# Patient Record
Sex: FEMALE | Race: WHITE | Employment: STUDENT | URBAN - METROPOLITAN AREA
[De-identification: names, ages, dates, MRNs, and addresses within clinical notes are randomized per-mention and may not be internally consistent; named-entity substitution may affect disease eponyms.]

---
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
## 2023-06-28 ENCOUNTER — TELEPHONE (OUTPATIENT)
Dept: FAMILY MEDICINE CLINIC | Age: 18
End: 2023-06-28

## 2023-07-13 ENCOUNTER — OFFICE VISIT (OUTPATIENT)
Dept: FAMILY MEDICINE CLINIC | Age: 18
End: 2023-07-13
Payer: COMMERCIAL

## 2023-07-13 VITALS
SYSTOLIC BLOOD PRESSURE: 104 MMHG | HEIGHT: 64 IN | WEIGHT: 135.9 LBS | HEART RATE: 88 BPM | RESPIRATION RATE: 16 BRPM | DIASTOLIC BLOOD PRESSURE: 60 MMHG | BODY MASS INDEX: 23.2 KG/M2 | OXYGEN SATURATION: 98 % | TEMPERATURE: 96.8 F

## 2023-07-13 DIAGNOSIS — R42 DIZZINESS: ICD-10-CM

## 2023-07-13 DIAGNOSIS — Z00.00 ANNUAL PHYSICAL EXAM: Primary | ICD-10-CM

## 2023-07-13 DIAGNOSIS — Z00.00 ANNUAL PHYSICAL EXAM: ICD-10-CM

## 2023-07-13 LAB
ERYTHROCYTE [DISTWIDTH] IN BLOOD BY AUTOMATED COUNT: 12 FL (ref 11.5–15)
HCT VFR BLD AUTO: 45.8 % (ref 34–48)
HGB BLD-MCNC: 14.4 G/DL (ref 11.5–15.5)
MCH RBC QN AUTO: 30.2 PG (ref 26–35)
MCHC RBC AUTO-ENTMCNC: 31.4 % (ref 32–34.5)
MCV RBC AUTO: 96 FL (ref 80–99.9)
PLATELET # BLD AUTO: 221 E9/L (ref 130–450)
PMV BLD AUTO: 10.9 FL (ref 7–12)
RBC # BLD AUTO: 4.77 E12/L (ref 3.5–5.5)
WBC # BLD: 5.3 E9/L (ref 4.5–11.5)

## 2023-07-13 PROCEDURE — 99395 PREV VISIT EST AGE 18-39: CPT | Performed by: FAMILY MEDICINE

## 2023-07-13 SDOH — ECONOMIC STABILITY: HOUSING INSECURITY
IN THE LAST 12 MONTHS, WAS THERE A TIME WHEN YOU DID NOT HAVE A STEADY PLACE TO SLEEP OR SLEPT IN A SHELTER (INCLUDING NOW)?: NO

## 2023-07-13 SDOH — ECONOMIC STABILITY: INCOME INSECURITY: HOW HARD IS IT FOR YOU TO PAY FOR THE VERY BASICS LIKE FOOD, HOUSING, MEDICAL CARE, AND HEATING?: NOT HARD AT ALL

## 2023-07-13 SDOH — ECONOMIC STABILITY: FOOD INSECURITY: WITHIN THE PAST 12 MONTHS, YOU WORRIED THAT YOUR FOOD WOULD RUN OUT BEFORE YOU GOT MONEY TO BUY MORE.: NEVER TRUE

## 2023-07-13 SDOH — ECONOMIC STABILITY: FOOD INSECURITY: WITHIN THE PAST 12 MONTHS, THE FOOD YOU BOUGHT JUST DIDN'T LAST AND YOU DIDN'T HAVE MONEY TO GET MORE.: NEVER TRUE

## 2023-07-13 ASSESSMENT — PATIENT HEALTH QUESTIONNAIRE - PHQ9
1. LITTLE INTEREST OR PLEASURE IN DOING THINGS: 0
2. FEELING DOWN, DEPRESSED OR HOPELESS: 0
SUM OF ALL RESPONSES TO PHQ9 QUESTIONS 1 & 2: 0
SUM OF ALL RESPONSES TO PHQ QUESTIONS 1-9: 0

## 2023-07-13 NOTE — PROGRESS NOTES
plan.    All questions answered. Sandra Reeves MD  7/13/2023    I have personally reviewed and updated the chief complaint, HPI, Past Medical, Family and Social History, as well as the above Review of Systems.

## 2023-07-14 LAB
ALBUMIN SERPL-MCNC: 4.8 G/DL (ref 3.5–5.2)
ALP SERPL-CCNC: 42 U/L (ref 35–104)
ALT SERPL-CCNC: 8 U/L (ref 0–32)
ANION GAP SERPL CALCULATED.3IONS-SCNC: 15 MMOL/L (ref 7–16)
AST SERPL-CCNC: 21 U/L (ref 0–31)
BILIRUB SERPL-MCNC: 0.2 MG/DL (ref 0–1.2)
BUN SERPL-MCNC: 16 MG/DL (ref 6–20)
CALCIUM SERPL-MCNC: 10 MG/DL (ref 8.6–10.2)
CHLORIDE SERPL-SCNC: 102 MMOL/L (ref 98–107)
CO2 SERPL-SCNC: 20 MMOL/L (ref 22–29)
CREAT SERPL-MCNC: 0.7 MG/DL (ref 0.5–1)
GLUCOSE SERPL-MCNC: 75 MG/DL (ref 74–99)
IRON SATN MFR SERPL: 25 % (ref 15–50)
IRON SERPL-MCNC: 98 MCG/DL (ref 37–145)
POTASSIUM SERPL-SCNC: 5.2 MMOL/L (ref 3.5–5)
PROT SERPL-MCNC: 7.3 G/DL (ref 6.4–8.3)
SODIUM SERPL-SCNC: 137 MMOL/L (ref 132–146)
TIBC SERPL-MCNC: 386 MCG/DL (ref 250–450)
TSH SERPL-MCNC: 1.62 UIU/ML (ref 0.27–4.2)

## 2024-05-21 ASSESSMENT — PATIENT HEALTH QUESTIONNAIRE - PHQ9
SUM OF ALL RESPONSES TO PHQ QUESTIONS 1-9: 0
1. LITTLE INTEREST OR PLEASURE IN DOING THINGS: NOT AT ALL
SUM OF ALL RESPONSES TO PHQ9 QUESTIONS 1 & 2: 0
2. FEELING DOWN, DEPRESSED OR HOPELESS: NOT AT ALL
2. FEELING DOWN, DEPRESSED OR HOPELESS: NOT AT ALL
SUM OF ALL RESPONSES TO PHQ9 QUESTIONS 1 & 2: 0
SUM OF ALL RESPONSES TO PHQ QUESTIONS 1-9: 0
1. LITTLE INTEREST OR PLEASURE IN DOING THINGS: NOT AT ALL

## 2024-05-24 ENCOUNTER — OFFICE VISIT (OUTPATIENT)
Dept: FAMILY MEDICINE CLINIC | Age: 19
End: 2024-05-24

## 2024-05-24 VITALS
RESPIRATION RATE: 16 BRPM | OXYGEN SATURATION: 98 % | DIASTOLIC BLOOD PRESSURE: 62 MMHG | WEIGHT: 132.9 LBS | SYSTOLIC BLOOD PRESSURE: 110 MMHG | TEMPERATURE: 97.9 F | BODY MASS INDEX: 22.69 KG/M2 | HEART RATE: 81 BPM | HEIGHT: 64 IN

## 2024-05-24 DIAGNOSIS — Z00.00 ANNUAL PHYSICAL EXAM: ICD-10-CM

## 2024-05-24 DIAGNOSIS — R20.0 NUMBNESS: ICD-10-CM

## 2024-05-24 DIAGNOSIS — R42 VERTIGO: Primary | ICD-10-CM

## 2024-05-24 RX ORDER — MECLIZINE HCL 12.5 MG/1
12.5 TABLET ORAL 3 TIMES DAILY PRN
Qty: 30 TABLET | Refills: 0 | Status: SHIPPED | OUTPATIENT
Start: 2024-05-24 | End: 2024-06-03

## 2024-05-24 NOTE — PROGRESS NOTES
to schedule as soon as possible if overdue, as this is important in assessing for undiagnosed pathology, especially cancer, as well as protecting against potentially harmful/life threatening disease.        Patient and/or guardian verbalizes understanding and agrees with above counseling, assessment and plan.    All questions answered.    Halima Hayward MD  5/24/2024    I have personally reviewed and updated the chief complaint, HPI, Past Medical, Family and Social History, as well as the above Review of Systems.

## 2024-06-13 ENCOUNTER — TELEPHONE (OUTPATIENT)
Dept: FAMILY MEDICINE CLINIC | Age: 19
End: 2024-06-13

## 2024-06-24 DIAGNOSIS — R20.0 NUMBNESS: ICD-10-CM

## 2024-06-24 NOTE — TELEPHONE ENCOUNTER
Dang called and wanted to know if the results for Jaylyn's MRI came back yet because they are just a little worried and have not heard anything yet-just wanted to put their minds at ease  
Left message last week following up with pt  
She must've had it done outside of [facility] as nothing is in her chart. Nothing has been faxed over yet. Awaiting results  
no

## 2024-07-03 ENCOUNTER — OFFICE VISIT (OUTPATIENT)
Dept: FAMILY MEDICINE CLINIC | Age: 19
End: 2024-07-03
Payer: COMMERCIAL

## 2024-07-03 VITALS
HEART RATE: 82 BPM | TEMPERATURE: 97.9 F | BODY MASS INDEX: 23.13 KG/M2 | WEIGHT: 135.5 LBS | DIASTOLIC BLOOD PRESSURE: 68 MMHG | OXYGEN SATURATION: 98 % | SYSTOLIC BLOOD PRESSURE: 106 MMHG | RESPIRATION RATE: 16 BRPM | HEIGHT: 64 IN

## 2024-07-03 DIAGNOSIS — R42 VERTIGO: Primary | ICD-10-CM

## 2024-07-03 PROCEDURE — 99213 OFFICE O/P EST LOW 20 MIN: CPT | Performed by: FAMILY MEDICINE

## 2024-07-03 NOTE — PROGRESS NOTES
Chief Complaint   Patient presents with    Dizziness     Vertigo       HPI:  Patient is here for follow-up of Vertigo.  Patient complains of getting dizzy while in the car. Pt states she had MRI done at [facility]    Pt is not fasting    HM reviewed.     Patient's past medical, surgical, social and/or family history reviewed, updated in chart, and are non-contributory (unless otherwise stated).  Medications and allergies also reviewed and updated in chart.    Review of Systems:  Constitutional:  No fever, no fatigue, no chills, no headaches, no weight change  Dermatology:  No rash, no mole, no dry or sensitive skin  ENT:  No cough, no sore throat, no sinus pain, no runny nose, no ear pain  Cardiology:  No chest pain, no palpitations, no leg edema, no shortness of breath, no PND  Gastroenterology:  No dysphagia, no abdominal pain, no nausea, no vomiting, no constipation, no diarrhea, no heartburn  Musculoskeletal:  No joint pain, no leg cramps, no back pain, no muscle aches  Respiratory:  No shortness of breath, no orthopnea, no wheezing, no MUKHERJEE, no hemoptysis  Urology:  No blood in the urine, no urinary frequency, no urinary incontinence, no urinary urgency, no nocturia, no dysuria  Vitals:    07/03/24 1057   BP: 106/68   Pulse: 82   Resp: 16   Temp: 97.9 °F (36.6 °C)   TempSrc: Temporal   SpO2: 98%   Weight: 61.5 kg (135 lb 8 oz)   Height: 1.626 m (5' 4\")       General:  Patient alert and oriented x 3, NAD, pleasant  HEENT:  Atraumatic, normocephalic, PERRLA, EOMI, clear conjunctiva, TMs clear, nose-clear, throat - no erythema  Neck:  Supple, no goiter, no carotid bruits, no lymphadenopathy  Lungs:  CTA B  Heart:  RRR, no murmurs, gallops or rubs  Abdomen:  Soft/nt/nd, + bowel sounds  Extremities:  No clubbing, cyanosis or edema  Skin: unremarkable    Assessment/Plan:      Jaylyn was seen today for dizziness.    Diagnoses and all orders for this visit:    Vertigo  -     [facility] - Cee, Zeke, DO,

## 2024-08-06 ENCOUNTER — TELEPHONE (OUTPATIENT)
Dept: FAMILY MEDICINE CLINIC | Age: 19
End: 2024-08-06

## 2024-08-06 RX ORDER — MECLIZINE HCL 12.5 MG/1
12.5 TABLET ORAL 3 TIMES DAILY PRN
Qty: 30 TABLET | Refills: 0 | Status: SHIPPED | OUTPATIENT
Start: 2024-08-06 | End: 2024-08-16

## 2024-08-06 NOTE — TELEPHONE ENCOUNTER
Jaylyn needs a refill for Meclizine sent to [facility] on [address]  in [place]--and she also wanted to know if there was anything else she could do to help with her vertigo that you could think of?

## 2024-08-07 DIAGNOSIS — R42 VERTIGO: Primary | ICD-10-CM

## 2024-10-11 ENCOUNTER — PROCEDURE VISIT (OUTPATIENT)
Dept: AUDIOLOGY | Age: 19
End: 2024-10-11
Payer: COMMERCIAL

## 2024-10-11 ENCOUNTER — OFFICE VISIT (OUTPATIENT)
Dept: ENT CLINIC | Age: 19
End: 2024-10-11

## 2024-10-11 VITALS
SYSTOLIC BLOOD PRESSURE: 118 MMHG | BODY MASS INDEX: 23.05 KG/M2 | DIASTOLIC BLOOD PRESSURE: 80 MMHG | WEIGHT: 135 LBS | HEIGHT: 64 IN | HEART RATE: 66 BPM

## 2024-10-11 DIAGNOSIS — R42 VERTIGO: Primary | ICD-10-CM

## 2024-10-11 DIAGNOSIS — H90.3 SENSORINEURAL HEARING LOSS, ASYMMETRICAL: ICD-10-CM

## 2024-10-11 DIAGNOSIS — R42 EPISODE OF DIZZINESS: ICD-10-CM

## 2024-10-11 PROCEDURE — 92567 TYMPANOMETRY: CPT | Performed by: AUDIOLOGIST

## 2024-10-11 PROCEDURE — 92557 COMPREHENSIVE HEARING TEST: CPT | Performed by: AUDIOLOGIST

## 2024-10-11 NOTE — PROGRESS NOTES
Subjective:     Patient ID:  Jaylyn Wilder is a 18 y.o. female.    HPI:  Vertigo - Dizziness  Patient presents with dizziness.  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 The dizziness has been present for Since March 2024.  The patient describes the symptoms as disequalibirum, vertigo, lightheadedness, and syncope. Symptoms are exacerbated by nothing in particular   She has been treated with meclizine (Antivert) with good improvement. Previous work up has been MRI 5/2024 negative per chart review. Happening 1-2 times a week to as much as daily. Denies history of migraines.     History of Head trauma: no   Type:no    History of surgery to the head/neck:yes   Type: Tonsillectomy and adenoidectomy and history of Tubes   when:August 3 2009  History of cerumen impaction: yes  History of noise exposure: yes   Type: band and dance  Spinning: yes   Length of time: hour  Hearing loss: no     Aural pressure: yes  Tinnitus:yes when syncope  Otalgia:no    Past Medical History:   Diagnosis Date    Anxiety      Past Surgical History:   Procedure Laterality Date    TONSILLECTOMY      WISDOM TOOTH EXTRACTION Bilateral 11/01/2020     Family History   Problem Relation Age of Onset    Other Mother         Liver Disease    Kidney Disease Mother     Heart Attack Father 52    High Blood Pressure Father     No Known Problems Brother     No Known Problems Maternal Grandmother     Other Maternal Grandfather         Liver Disease    Kidney Disease Maternal Grandfather     No Known Problems Paternal Grandmother     No Known Problems Paternal Grandfather      Social History     Socioeconomic History    Marital status: Single     Spouse name: None    Number of children: None    Years of education: None    Highest education level: None   Tobacco Use    Smoking status: Never    Smokeless tobacco: Never   Vaping Use    Vaping status: Never Used   Substance and Sexual Activity    Alcohol use: Never     Comment: rare    Drug use: Never

## 2024-10-14 ENCOUNTER — TELEPHONE (OUTPATIENT)
Dept: AUDIOLOGY | Age: 19
End: 2024-10-14

## 2024-10-14 NOTE — TELEPHONE ENCOUNTER
Called and left message on patient phone to call back.  Called other contact ( fabian Brooks) and scheduled for 12/19    1230 pm arrival for 1 pm appt     Patient is on break and has follow up with Dr LYON 12/20 to review results of VNG

## 2024-10-16 NOTE — PROGRESS NOTES
This patient was referred for audiometric and tympanometric testing by Dr. Mike due to vertigo and dizziness.      Audiometry using pure tone air and bone conduction testing revealed normal-to-mild  sensorineural hearing loss, bilaterally (NOTE:  RIGHT EAR ASYMMETRY).  Reliability was good. Speech reception thresholds were in good agreement with the pure tone averages, bilaterally. Speech discrimination scores were 100%, bilaterally at 50dBHL.    Tympanometry revealed normal middle ear peak pressure and compliance, bilaterally.    The results were reviewed with the patient and ordering provider.     Recommendations for follow up will be made pending ordering provider consult.    Peter Mejía CCC/KYLIE  Audiologist  [id number]  NPI#:  [id number]      Electronically signed by Darion Carrillo on 10/16/2024 at 8:19 AM

## 2024-12-05 ENCOUNTER — TELEPHONE (OUTPATIENT)
Dept: AUDIOLOGY | Age: 19
End: 2024-12-05

## 2024-12-05 NOTE — TELEPHONE ENCOUNTER
Patient's mother called and stated that they cannot get patient here until at least [id number] due to work in the morning. Called back and LVM stating that would be fine, we will see her as soon as she comes in.     Electronically signed by Darion Ames on 12/5/2024 at 10:41 AM

## 2024-12-05 NOTE — TELEPHONE ENCOUNTER
Called patient and LVM in regard to moving patient's VNG up to 9 AM instead of 1 PM. Will await return phone call.     Electronically signed by Darion Ames on 12/5/2024 at 9:57 AM

## 2024-12-19 ENCOUNTER — HOSPITAL ENCOUNTER (OUTPATIENT)
Dept: AUDIOLOGY | Age: 19
Discharge: HOME | End: 2024-12-19
Payer: COMMERCIAL

## 2024-12-19 PROCEDURE — 92540 BASIC VESTIBULAR EVALUATION: CPT

## 2024-12-19 PROCEDURE — 92537 CALORIC VSTBLR TEST W/REC: CPT

## 2024-12-19 NOTE — PROGRESS NOTES
VNG EVALUATION    REASON FOR REFERRAL:  This patient was referred for VNG testing by Dr. Mike due to dizziness. Patient reported having an episode in March 2024 when she was standing for a period of time and felt off balance.   Patient has had previous symptoms of disequilibrium, vertigo, lightheadedness, and syncope. Patient's last episode was around a week ago. Patient stated episodes happen randomly and are happening less than what they were. Sometimes they are quick episodes, sometimes they last longer with nausea.      RESULTS:  Bithermal caloric irrigations revealed appropriate beating nystagmus with no unilateral weakness.  Directional preponderance and fixation suppression were within normal limits.  No irregular eye movements were recorded during saccades, pendular tracking, or optokinetic testing. No significant nystagmus was recorded during gaze or positional testing.        IMPRESSION:  VNG test results indicates vestibular function within normal limits bilaterally.      Oculomotor and positional test results were within normal limits.       If I can be of further assistance or provide additional information, please do not hesitate to contact this office.    Thank you for the referral.      __________________________________  Darion Ames/CCC-A  [facility] [id number]  Electronically signed by Darion Ames on 12/19/2024 at 1:01 PM

## 2024-12-20 ENCOUNTER — OFFICE VISIT (OUTPATIENT)
Dept: ENT CLINIC | Age: 19
End: 2024-12-20
Payer: COMMERCIAL

## 2024-12-20 VITALS
HEART RATE: 76 BPM | WEIGHT: 135 LBS | SYSTOLIC BLOOD PRESSURE: 114 MMHG | BODY MASS INDEX: 23.17 KG/M2 | DIASTOLIC BLOOD PRESSURE: 74 MMHG

## 2024-12-20 DIAGNOSIS — G43.109 VERTIGINOUS MIGRAINE: Primary | ICD-10-CM

## 2024-12-20 PROCEDURE — G8420 CALC BMI NORM PARAMETERS: HCPCS | Performed by: OTOLARYNGOLOGY

## 2024-12-20 PROCEDURE — G8484 FLU IMMUNIZE NO ADMIN: HCPCS | Performed by: OTOLARYNGOLOGY

## 2024-12-20 PROCEDURE — 1036F TOBACCO NON-USER: CPT | Performed by: OTOLARYNGOLOGY

## 2024-12-20 PROCEDURE — G8427 DOCREV CUR MEDS BY ELIG CLIN: HCPCS | Performed by: OTOLARYNGOLOGY

## 2024-12-20 PROCEDURE — 99213 OFFICE O/P EST LOW 20 MIN: CPT | Performed by: OTOLARYNGOLOGY

## 2024-12-20 NOTE — PROGRESS NOTES
[facility]  TASHA FernandezO. Ms.Ed        Patient Name:  Jaylyn Wilder  :  2005     CHIEF C/O:    Chief Complaint   Patient presents with    Follow-up     Patient states vertigo has been a little bit better. Less frequent than before but still present        HISTORY OBTAINED FROM:  patient    HISTORY OF PRESENT ILLNESS:       Jaylyn is a 18 y.o. year old female, here today for follow up of:       Patient seen and examined for a known history of dizziness, she underwent VNG, she has been doing well ever since, no recent history of dizziness there was a mild episode approximately 2 weeks ago prior to her finals at [employer].  VNG was consistent with negative for any peripheral lesion.  No other complaints today of ear hearing loss ear pain or drainage.         Past Medical History:   Diagnosis Date    Anxiety      Past Surgical History:   Procedure Laterality Date    TONSILLECTOMY      WISDOM TOOTH EXTRACTION Bilateral 2020       Current Outpatient Medications:     BIOTIN PO, Take by mouth, Disp: , Rfl:     Levonorgest-Eth Estradiol-Iron 0.1-20 MG-MCG(21) TABS, Take 1 tablet by mouth daily, Disp: 3 packet, Rfl: 3    clindamycin-benzoyl peroxide (BENZACLIN) 1-5 % gel, , Disp: , Rfl:     doxycycline hyclate (VIBRAMYCIN) 100 MG capsule, Take 1 capsule by mouth daily Once every other day, Disp: , Rfl:     Multiple Vitamins-Minerals (THERAPEUTIC MULTIVITAMIN-MINERALS) tablet, Take 1 tablet by mouth daily, Disp: , Rfl:   Seasonal  Social History     Tobacco Use    Smoking status: Never    Smokeless tobacco: Never   Vaping Use    Vaping status: Never Used   Substance Use Topics    Alcohol use: Never     Comment: rare    Drug use: Never     Family History   Problem Relation Age of Onset    Other Mother         Liver Disease    Kidney Disease Mother     Heart Attack Father 52    High Blood Pressure Father     No Known Problems Brother     No Known Problems Maternal Grandmother     Other

## 2024-12-23 ASSESSMENT — ENCOUNTER SYMPTOMS
VOMITING: 0
COUGH: 0
SHORTNESS OF BREATH: 0

## 2025-02-06 ENCOUNTER — TELEPHONE (OUTPATIENT)
Dept: ENT CLINIC | Age: 20
End: 2025-02-06

## 2025-02-06 NOTE — TELEPHONE ENCOUNTER
Patient is requesting a letter from Dr. Mike in the form of a PDF file if possible so she can send to her housing at [facility] for accomodations due to finding mold in her current residence. She states that the vertigo she was diagnosed with was triggered by the mold that was found. She also states that the vertiginous migraines are caused by stress, when she resided in a multiroom suite she was under stress.  Patient states that her request for the type of room she is requesting to get approval of which is a single room with a private bathroom needs to be listed on the letter as well. She is asking for the following questions to be answered by the physician and this can be emailed back to her personal email [email or web address] please.  Questions:   1. What are your credentials and how long have you provided care for the student?  2. What is the student's diagnoses, date of diagnoses and the relevent treatment as it intersects with a residential environment?  A: Does the student's diagnoses meet the criteria as defined in the ADA?  3. Provide a detailed description of the student's disability and how it can be impacted by an on campus residential environment. This should include symptom frequency, severity and duration.  4. Please describe the specific barriers the student experiences or is likely to experience within on campus residential living.  5. Your recommendation for modifications for their living environment including whether it is a beneficial preference or a disability requirement.  6. Your signature, time stamp for signature    Please contact patient to discuss

## 2025-02-07 NOTE — TELEPHONE ENCOUNTER
Notified mom the office does not do things like that but recommend patient call her primary care doctor. Mom states she understood and stated she will have patient to call doctor.

## 2025-04-23 ENCOUNTER — TELEMEDICINE (OUTPATIENT)
Age: 20
End: 2025-04-23
Payer: COMMERCIAL

## 2025-04-23 DIAGNOSIS — G43.809 VESTIBULAR MIGRAINE: Primary | ICD-10-CM

## 2025-04-23 DIAGNOSIS — G43.119 INTRACTABLE MIGRAINE WITH AURA WITHOUT STATUS MIGRAINOSUS: ICD-10-CM

## 2025-04-23 PROCEDURE — 99204 OFFICE O/P NEW MOD 45 MIN: CPT | Performed by: PSYCHIATRY & NEUROLOGY

## 2025-04-23 RX ORDER — TOPIRAMATE 25 MG/1
25 TABLET, FILM COATED ORAL NIGHTLY
Qty: 7 TABLET | Refills: 0 | Status: SHIPPED | OUTPATIENT
Start: 2025-04-23 | End: 2025-04-30

## 2025-04-23 RX ORDER — RIZATRIPTAN BENZOATE 10 MG/1
10 TABLET ORAL PRN
Qty: 9 TABLET | Refills: 4 | Status: SHIPPED | OUTPATIENT
Start: 2025-04-23

## 2025-04-23 RX ORDER — TOPIRAMATE 100 MG/1
100 TABLET, FILM COATED ORAL
Qty: 30 TABLET | Refills: 3 | Status: SHIPPED | OUTPATIENT
Start: 2025-05-07

## 2025-04-23 RX ORDER — TOPIRAMATE 50 MG/1
50 TABLET, FILM COATED ORAL
Qty: 7 TABLET | Refills: 0 | Status: SHIPPED | OUTPATIENT
Start: 2025-04-30 | End: 2025-05-07

## 2025-04-23 NOTE — PROGRESS NOTES
the form of visual as well as sensory auras  Her headache frequency is at least 15 to 20 days a month with the dizziness  Her MRI of the brain as well as VNG was nonrevealing for other causes of headache as well as vertigo  Since one of her triggers for the vestibular migraine include humidity in the room with a shared bathroom, will be willing to write a letter for accommodations for her housing facility on campus at [facility]    Does need headache prophylaxis with Topamax titrated up to 100 mg nightly  Advised her about side effects of Topamax  Advised her that Topamax will interfere with her birth control pills and needs to avoid pregnancy on Topamax with the teratogenic effect  Use meclizine as needed  Use Maxalt as needed    2. Intractable migraine with aura without status migrainosus  Recommend headache prophylaxis with Topamax titrated up to 100 mg nightly        Renae Low, MD Jaylyn Wilder, was evaluated through a synchronous (real-time) audio-video encounter. The patient (and/or guardian if applicable) is aware that this is a billable service, which includes applicable co-pays. This virtual visit was conducted with patient's (and/or legal guardian's) consent. Patient identification was verified, and a caregiver was present when appropriate.  The patient was located at Home: [address] [place] [id number]  The provider was located at Home (City/State): [place]  Yes, I confirm.   Consults     Total time spent on this encounter: Not billed by time    An electronic signature was used to authenticate this note.

## 2025-05-30 ENCOUNTER — OFFICE VISIT (OUTPATIENT)
Dept: FAMILY MEDICINE CLINIC | Age: 20
End: 2025-05-30
Payer: COMMERCIAL

## 2025-05-30 VITALS
BODY MASS INDEX: 23.22 KG/M2 | SYSTOLIC BLOOD PRESSURE: 110 MMHG | OXYGEN SATURATION: 97 % | HEART RATE: 65 BPM | WEIGHT: 136 LBS | HEIGHT: 64 IN | DIASTOLIC BLOOD PRESSURE: 72 MMHG | TEMPERATURE: 97.8 F

## 2025-05-30 DIAGNOSIS — Z00.00 ANNUAL PHYSICAL EXAM: Primary | ICD-10-CM

## 2025-05-30 PROCEDURE — 99395 PREV VISIT EST AGE 18-39: CPT | Performed by: FAMILY MEDICINE

## 2025-05-30 SDOH — ECONOMIC STABILITY: FOOD INSECURITY: WITHIN THE PAST 12 MONTHS, THE FOOD YOU BOUGHT JUST DIDN'T LAST AND YOU DIDN'T HAVE MONEY TO GET MORE.: NEVER TRUE

## 2025-05-30 SDOH — ECONOMIC STABILITY: FOOD INSECURITY: WITHIN THE PAST 12 MONTHS, YOU WORRIED THAT YOUR FOOD WOULD RUN OUT BEFORE YOU GOT MONEY TO BUY MORE.: NEVER TRUE

## 2025-05-30 ASSESSMENT — PATIENT HEALTH QUESTIONNAIRE - PHQ9
SUM OF ALL RESPONSES TO PHQ QUESTIONS 1-9: 0
SUM OF ALL RESPONSES TO PHQ QUESTIONS 1-9: 0
1. LITTLE INTEREST OR PLEASURE IN DOING THINGS: NOT AT ALL
SUM OF ALL RESPONSES TO PHQ QUESTIONS 1-9: 0
2. FEELING DOWN, DEPRESSED OR HOPELESS: NOT AT ALL
1. LITTLE INTEREST OR PLEASURE IN DOING THINGS: NOT AT ALL
SUM OF ALL RESPONSES TO PHQ9 QUESTIONS 1 & 2: 0
2. FEELING DOWN, DEPRESSED OR HOPELESS: NOT AT ALL
SUM OF ALL RESPONSES TO PHQ QUESTIONS 1-9: 0

## 2025-05-30 NOTE — PROGRESS NOTES
as well as the above Review of Systems.     The patient (or guardian, if applicable) and other individuals in attendance with the patient were advised that Artificial Intelligence will be utilized during this visit to record, process the conversation to generate a clinical note and to support improvement of the AI technology. The patient (or guardian, if applicable) and other individuals in attendance at the appointment consented to the use of AI, including the recording.      An electronic signature was used to authenticate this note.    --Halima Hayward MD